# Patient Record
Sex: FEMALE | Race: WHITE
[De-identification: names, ages, dates, MRNs, and addresses within clinical notes are randomized per-mention and may not be internally consistent; named-entity substitution may affect disease eponyms.]

---

## 2018-02-16 ENCOUNTER — HOSPITAL ENCOUNTER (OUTPATIENT)
Dept: HOSPITAL 53 - M SMT | Age: 38
End: 2018-02-16
Attending: OBSTETRICS & GYNECOLOGY
Payer: COMMERCIAL

## 2018-02-16 DIAGNOSIS — R10.2: Primary | ICD-10-CM

## 2018-02-16 LAB
BASO #: 0.1 10^3/UL (ref 0–0.2)
BASO %: 0.8 % (ref 0–1)
EOS #: 0.2 10^3/UL (ref 0–0.5)
EOSINOPHIL NFR BLD AUTO: 1.7 % (ref 0–3)
FOLLICLE STIMULATING HORMONE: 6.4 MIU/ML
HEMATOCRIT: 38.8 % (ref 36–47)
HEMOGLOBIN: 12.1 G/DL (ref 12–16)
IMMATURE GRANULOCYTE %: 0.9 % (ref 0–3)
LH SERPL-ACNC: 1.9 MIU/ML
LYMPH #: 3.3 10^3/UL (ref 1.5–4.5)
LYMPH %: 31.8 % (ref 24–44)
MEAN CORPUSCULAR HEMOGLOBIN: 25.4 PG (ref 27–33)
MEAN CORPUSCULAR HGB CONC: 31.2 G/DL (ref 32–36.5)
MEAN CORPUSCULAR VOLUME: 81.5 FL (ref 80–96)
MONO #: 0.7 10^3/UL (ref 0–0.8)
MONO %: 6.6 % (ref 0–5)
NEUTROPHILS #: 6.1 10^3/UL (ref 1.8–7.7)
NEUTROPHILS %: 58.2 % (ref 36–66)
NRBC BLD AUTO-RTO: 0 % (ref 0–0)
PLATELET COUNT, AUTOMATED: 272 10^3/UL (ref 150–450)
RED BLOOD COUNT: 4.76 10^6/UL (ref 4–5.4)
RED CELL DISTRIBUTION WIDTH: 15.9 % (ref 11.5–14.5)
THYROID STIMULATING HORMONE: 0.6 UIU/ML (ref 0.36–3.74)
WHITE BLOOD COUNT: 10.5 10^3/UL (ref 4–10)

## 2018-02-16 PROCEDURE — 83001 ASSAY OF GONADOTROPIN (FSH): CPT

## 2018-06-14 ENCOUNTER — HOSPITAL ENCOUNTER (OUTPATIENT)
Dept: HOSPITAL 53 - M SMT | Age: 38
End: 2018-06-14
Attending: OBSTETRICS & GYNECOLOGY
Payer: COMMERCIAL

## 2018-06-14 DIAGNOSIS — R10.2: Primary | ICD-10-CM

## 2018-06-14 PROCEDURE — 76830 TRANSVAGINAL US NON-OB: CPT

## 2018-06-20 ENCOUNTER — HOSPITAL ENCOUNTER (OUTPATIENT)
Dept: HOSPITAL 53 - M LAB REF | Age: 38
End: 2018-06-20
Attending: OBSTETRICS & GYNECOLOGY
Payer: COMMERCIAL

## 2018-06-20 DIAGNOSIS — R10.2: Primary | ICD-10-CM

## 2021-04-08 ENCOUNTER — HOSPITAL ENCOUNTER (OUTPATIENT)
Dept: HOSPITAL 53 - M LABSMTC | Age: 41
End: 2021-04-08
Attending: ANESTHESIOLOGY
Payer: MEDICARE

## 2021-04-08 DIAGNOSIS — Z20.828: Primary | ICD-10-CM

## 2021-04-08 DIAGNOSIS — Z11.59: ICD-10-CM

## 2021-04-13 ENCOUNTER — HOSPITAL ENCOUNTER (OUTPATIENT)
Dept: HOSPITAL 53 - M SDC | Age: 41
Setting detail: OBSERVATION
LOS: 1 days | Discharge: HOME | End: 2021-04-14
Attending: PLASTIC SURGERY | Admitting: PLASTIC SURGERY
Payer: MEDICARE

## 2021-04-13 VITALS — SYSTOLIC BLOOD PRESSURE: 121 MMHG | DIASTOLIC BLOOD PRESSURE: 80 MMHG

## 2021-04-13 VITALS — SYSTOLIC BLOOD PRESSURE: 122 MMHG | DIASTOLIC BLOOD PRESSURE: 82 MMHG

## 2021-04-13 VITALS — DIASTOLIC BLOOD PRESSURE: 84 MMHG | SYSTOLIC BLOOD PRESSURE: 124 MMHG

## 2021-04-13 VITALS — DIASTOLIC BLOOD PRESSURE: 81 MMHG | SYSTOLIC BLOOD PRESSURE: 123 MMHG

## 2021-04-13 VITALS — WEIGHT: 210 LBS | BODY MASS INDEX: 35.85 KG/M2 | HEIGHT: 64 IN

## 2021-04-13 DIAGNOSIS — F32.9: ICD-10-CM

## 2021-04-13 DIAGNOSIS — Z88.1: ICD-10-CM

## 2021-04-13 DIAGNOSIS — Z79.899: ICD-10-CM

## 2021-04-13 DIAGNOSIS — E03.9: ICD-10-CM

## 2021-04-13 DIAGNOSIS — K21.9: ICD-10-CM

## 2021-04-13 DIAGNOSIS — Z79.82: ICD-10-CM

## 2021-04-13 DIAGNOSIS — F64.8: ICD-10-CM

## 2021-04-13 DIAGNOSIS — N62: Primary | ICD-10-CM

## 2021-04-13 DIAGNOSIS — Z88.8: ICD-10-CM

## 2021-04-13 DIAGNOSIS — Z88.0: ICD-10-CM

## 2021-04-13 DIAGNOSIS — E11.9: ICD-10-CM

## 2021-04-13 PROCEDURE — 15877 SUCTION LIPECTOMY TRUNK: CPT

## 2021-04-13 PROCEDURE — 19303 MAST SIMPLE COMPLETE: CPT

## 2021-04-13 PROCEDURE — 88300 SURGICAL PATH GROSS: CPT

## 2021-04-13 PROCEDURE — 96376 TX/PRO/DX INJ SAME DRUG ADON: CPT

## 2021-04-13 PROCEDURE — 96374 THER/PROPH/DIAG INJ IV PUSH: CPT

## 2021-04-13 PROCEDURE — 88305 TISSUE EXAM BY PATHOLOGIST: CPT

## 2021-04-13 PROCEDURE — 81025 URINE PREGNANCY TEST: CPT

## 2021-04-13 RX ADMIN — SODIUM CHLORIDE, POTASSIUM CHLORIDE, SODIUM LACTATE AND CALCIUM CHLORIDE SCH MLS/HR: 600; 310; 30; 20 INJECTION, SOLUTION INTRAVENOUS at 20:41

## 2021-04-13 RX ADMIN — FENTANYL CITRATE PRN MCG: 50 INJECTION, SOLUTION INTRAMUSCULAR; INTRAVENOUS at 18:28

## 2021-04-13 RX ADMIN — FENTANYL CITRATE PRN MCG: 50 INJECTION, SOLUTION INTRAMUSCULAR; INTRAVENOUS at 18:39

## 2021-04-13 RX ADMIN — DOCUSATE SODIUM SCH MG: 100 CAPSULE, LIQUID FILLED ORAL at 20:42

## 2021-04-13 RX ADMIN — BETHANECHOL CHLORIDE SCH MG: 10 TABLET ORAL at 20:42

## 2021-04-13 RX ADMIN — GABAPENTIN SCH MG: 400 CAPSULE ORAL at 16:00

## 2021-04-13 RX ADMIN — FENTANYL CITRATE PRN MCG: 50 INJECTION, SOLUTION INTRAMUSCULAR; INTRAVENOUS at 18:33

## 2021-04-13 RX ADMIN — FENTANYL CITRATE PRN MCG: 50 INJECTION, SOLUTION INTRAMUSCULAR; INTRAVENOUS at 18:23

## 2021-04-13 RX ADMIN — POTASSIUM CHLORIDE SCH MEQ: 750 TABLET, EXTENDED RELEASE ORAL at 20:42

## 2021-04-13 RX ADMIN — TOPIRAMATE SCH MG: 100 TABLET, FILM COATED ORAL at 20:41

## 2021-04-13 RX ADMIN — GABAPENTIN SCH MG: 400 CAPSULE ORAL at 20:41

## 2021-04-13 RX ADMIN — MORPHINE SULFATE PRN MG: 4 INJECTION INTRAVENOUS at 22:32

## 2021-04-13 NOTE — ROOPDOC
[facility] Report Of Operation


Report of Operation


DATE OF PROCEDURE: 4/13/21





PREOPERATIVE DIAGNOSIS: Bilateral breast hypertrophy, gender dysphoria.





POSTOPERATIVE DIAGNOSIS: same   





FINDINGS: Female chest pattern 





PROCEDURE: Chest masculinization procedure with help of subcutaneous 

mastectomies, lateral chest liposuction and free nipple graft.





SURGEON: Dr Hernandez





ASSISTANT: Dr Ruiz





ANESTHESIA: general





SPECIMENS: Right breast 440 gm, Left breast 547 gm, liposuction fluid 300 ml. 





ESTIMATED BLOOD LOSS: 75 cc





REPLACED: none





DRAINS: 10 mm MELVIN x 2





COMPLICATIONS: none





POSTOPERATIVE CONDITION: stable











DESCRIPTION OF PROCEDURE: This is a 41-year-old transgender male who willing to 

have chest masculinization procedure. Patient fits the criteria for the 

procedure. He has stopped testosterone 3 weeks ago. Risks, benefits, and 

alternatives were discussed with the patient in detail, and he is ready to 

proceed. 


The day of surgery, he was marked in the upright position and informed consent 

was obtained. He measured 29 cm from sternal notch to nipple on  both sides, IMF

at 22 cm bilaterally. Patient was marked according to double incision 

subcutaneous mastectomy. He was brought into the operating room and placed in 

the supine position. Preoperative antibiotics and 5000 units heparin 

subcutaneous were given. Sequential pneumatic stocking were placed on the lower 

calves. General anesthesia was induced. He was prepped and draped in the usual 

sterile fashion. 





We started our procedure on the right side.  Nipple areolar complex was outlined

20 mm in diameter.  Superior incision carried out followed by the inframammary 

incision. Started undermining the flap with thick edges from the superior 

incision toward the midportion of the pectoralis major then the breast was 

removed starting from top to the inferior incision at the inframammary line 

along the pectoralis fascia. Total weight on the right side is 440 grams. 

Hemostasis was obtained using electrocautery.  We used Exparel 6 cc for local 

anesthesia to infiltrate in the Pectoralis muscle. Started closing the flap with

interrupted 0 Vicryl sutures. 10 mm Jovany-Jose drain was placed through the 

separate stab incision. Then we turn our attention to the left side. Nipple 

areolar complex was outlined 20 mm in diameter.  Superior incision carried out 

followed by the inframammary incision. Started undermining the flap with thick 

edges from the superior incision toward the midportion of the pectoralis major 

then the breast was removed starting from top to the inferior incision at the 

inframammary line along the pectoralis fascia.  Hemostasis was obtained using 

electrocautery. Total weight left side 547 g. We used Exparel 6 cc for local 

anesthesia to infiltrate in the Pectoralis muscle. Started closing the flap with

interrupted 0 Vicryl sutures. 10 mm Jovany-Jose drain was placed through the 

separate stab incision.





Tumescent solution was infiltrated on the lateral chest bilaterally totaling 150

on each side, then the VASER liposuction was performed 2 minutes on each side 

followed by regular light suction to remove the rest of the fatty tissue. Total 

liposuction 300 mL. 50 mL of tumescent solution infiltrated middle of the chest 

and suction assisted lipectomy performed to smooth out the contour.


Than the continued our closure of inframammary incisions with interrupted 3-0 

Monocryl sutures followed by 3-0 Monocryl V lock suture. 





Breast tissue removed was stored on the sterile table. At this point we 

harvested the nipple areolar complex from each breast. Tissue was defatted and 

transferred to a chest as a free graft. Location of the nipple is 2.5 cm from 

inframammary line. Oval incision 1.5x2.4cm was created for the Nipple areolar 

complex graft was secured in place with interrupted 4-0 and 5-0 chromic sutures.

Bolster dressing was applied and secured in place with 4-0 nylon sutures.


Remaining Exparel injected in the horizontal incision. Total Exparel use 20 cc. 


Resected tissue sent to pathology in two specimens right and left breast tissue.




Dressings were applied to vertical and horizontal incision: Xeroform, bulky 

dressing and binder.


Patient was extubated in the operating room without difficulty and was 

transferred to the recovery room in stable condition.











ED EHRNANDEZ DO              Apr 13, 2021 17:37

## 2021-04-13 NOTE — POST-OPPD
Postoperative Procedure Note


Date Of Procedure:  Apr 13, 2021


PREOPERATIVE DIAGNOSIS: Bilateral breast hypertrophy, gender dysphoria.





POSTOPERATIVE DIAGNOSIS: same   





FINDINGS: Female chest pattern 





PROCEDURE: Chest masculinization procedure with help of subcutaneous 

mastectomies, lateral chest liposuction and free nipple graft.





SURGEON: Dr Hernandez





ASSISTANT: Dr Ruiz





ANESTHESIA: general





SPECIMENS: Right breast 440 gm, Left breast 547 gm, liposuction fluid 300 ml. 





ESTIMATED BLOOD LOSS: 75 cc





REPLACED: none





DRAINS: 10 mm MELVIN x 2





COMPLICATIONS: none





POSTOPERATIVE CONDITION: stable











ED HERNANDEZ DO              Apr 13, 2021 17:37

## 2021-04-14 VITALS — SYSTOLIC BLOOD PRESSURE: 120 MMHG | DIASTOLIC BLOOD PRESSURE: 79 MMHG

## 2021-04-14 VITALS — SYSTOLIC BLOOD PRESSURE: 111 MMHG | DIASTOLIC BLOOD PRESSURE: 68 MMHG

## 2021-04-14 VITALS — SYSTOLIC BLOOD PRESSURE: 113 MMHG | DIASTOLIC BLOOD PRESSURE: 74 MMHG

## 2021-04-14 RX ADMIN — GABAPENTIN SCH MG: 400 CAPSULE ORAL at 11:54

## 2021-04-14 RX ADMIN — TOPIRAMATE SCH MG: 100 TABLET, FILM COATED ORAL at 11:53

## 2021-04-14 RX ADMIN — BETHANECHOL CHLORIDE SCH MG: 10 TABLET ORAL at 11:54

## 2021-04-14 RX ADMIN — MORPHINE SULFATE PRN MG: 4 INJECTION INTRAVENOUS at 03:24

## 2021-04-14 RX ADMIN — DOCUSATE SODIUM SCH MG: 100 CAPSULE, LIQUID FILLED ORAL at 11:53

## 2021-04-14 RX ADMIN — POTASSIUM CHLORIDE SCH MEQ: 750 TABLET, EXTENDED RELEASE ORAL at 11:53

## 2021-04-14 RX ADMIN — SODIUM CHLORIDE, POTASSIUM CHLORIDE, SODIUM LACTATE AND CALCIUM CHLORIDE SCH MLS/HR: 600; 310; 30; 20 INJECTION, SOLUTION INTRAVENOUS at 07:00

## 2021-04-14 NOTE — IPNPDOC
Subjective


General


Date Seen:  Apr 14, 2021





Subject


Chief Complaint/History


The patient is a 41-year-old female admitted with a reason for visit of Gender 

Dysphoria, Breast Hypertrophy. 


Patient s/p chest masculinization procedure POD 1. Doing well. Pain controlled. 

Tolerating diet.





Current Medications


Current Medications





Current Medications








 Medications


  (Trade)  Dose


 Ordered  Sig/Damaso


 Route


 PRN Reason  Start Time


 Stop Time Status Last Admin


Dose Admin


 


 Acetaminophen


  (Tylenol Tab)  650 mg  Q6H  PRN


 PO


 MILD PAIN (PS 1-4)  4/13/21 17:40


     





 


 Bethanechol


 Chloride


  (Urecholine)  10 mg  BID


 PO


   4/13/21 21:00


    4/13/21 20:42





 


 Docusate Sodium


  (Colace)  100 mg  BID


 PO


   4/13/21 21:00


    4/13/21 20:42





 


 Fentanyl Citrate


  (Sublimaze)  25 mcg  Q5MP  PRN


 IV


 PAIN LEVEL 5-10  4/13/21 18:00


 4/13/21 19:00 DC 4/13/21 18:39





 


 Furosemide


  (Lasix)  40 mg  DAILY


 PO


   4/14/21 09:00


     





 


 Gabapentin


  (Neurontin)  800 mg  TID


 PO


   4/13/21 16:00


    4/13/21 20:41





 


 Lactated Ringer's  1,000 ml @ 


 75 mls/hr  [id number]


 IV


   4/13/21 17:40


    4/13/21 20:41





 


 Lactated Ringer's  1,000 ml @ 


 100 mls/hr  Q10H


 IV


   4/13/21 18:00


 4/13/21 19:00 DC 4/13/21 17:35





 


 Levothyroxine


 Sodium


  (Synthroid)  75 mcg  DAILY@0600


 PO


   4/14/21 06:00


    4/14/21 05:06





 


 Loratadine


  (Claritin)  10 mg  DAILY


 PO


   4/14/21 09:00


     





 


 Meperidine HCl


  (Demerol)  12.5 mg  Q5MP  PRN


 IV


 SHIVERING  4/13/21 18:00


 4/13/21 19:00 DC  





 


 Metoclopramide HCl


  (REGLAN


 INJection)  10 mg  Q6HP  PRN


 IV


 NAUSEA OR VOMITING  4/13/21 18:00


 4/13/21 19:00 DC  





 


 Miscellaneous


  (Unresolved


 Clarification


 Entry)  SEE LABEL


 COMMENTS  UNRESOLVED


 XX


   4/13/21 00:01


 4/13/21 11:47 DC  





 


 Morphine Sulfate


  (Morphine


 Sulfate Inj)  4 mg  Q4HP  PRN


 IV


 SEVERE PAIN (PS 8-10)  4/13/21 17:40


    4/14/21 03:24





 


 Ondansetron HCl


  (ZOFRAN


 INJection)  4 mg  Q4H  PRN


 IV


 NAUSEA OR VOMITING  4/13/21 17:40


     





 


 Ondansetron HCl


  (ZOFRAN


 INJection)  4 mg  Q4HP  PRN


 IV


 NAUSEA OR VOMITING  4/13/21 18:00


 4/13/21 19:00 DC  





 


 Oxycodone HCl


  (Roxicodone,


 Oxyir)  5 mg  ASDIRECTED  PRN


 PO


 PAIN LEVEL 1-4  4/13/21 18:00


 4/13/21 19:00 DC 4/13/21 18:58





 


 Oxycodone/


 Acetaminophen


  (Percocet 5mg/


 325mg Tablet)  2 tab  Q4HP  PRN


 PO


 PAIN LEVEL 4-7  4/13/21 17:40


    4/14/21 06:09





 


 Pantoprazole


 Sodium


  (Protonix)  20 mg  DAILY


 PO


   4/14/21 09:00


     





 


 Potassium Chloride


  (Micro-K


 Extencaps)  10 meq  BID


 PO


   4/13/21 21:00


    4/13/21 20:42





 


 Quetiapine


 Fumarate


  (SEROquel)  150 mg  QHS


 PO


   4/13/21 21:00


    4/13/21 20:42





 


 Simvastatin


  (Zocor)  20 mg  QPM


 PO


   4/13/21 21:00


    4/13/21 20:41





 


 Topiramate


  (TopAMAX)  100 mg  BID


 PO


   4/13/21 21:00


    4/13/21 20:41





 


 Trazodone HCl


  (Desyrel)  50 mg  QHSP  PRN


 PO


 SLEEP  4/13/21 21:00


    4/14/21 01:09














Allergies


Coded Allergies:  


     TAPE (Verified  Allergy, Unknown, 4/6/21)


     Penicillins (Verified  Adverse Reaction, Mild, vomiting, 4/13/21)


     clarithromycin (Verified  Adverse Reaction, Mild, vomiting, 4/13/21)


     clindamycin (Verified  Adverse Reaction, Mild, vomiting, 4/13/21)


     ketorolac (Verified  Adverse Reaction, Mild, vomiting, 4/13/21)


     naproxen (Verified  Adverse Reaction, Mild, vomiting, 4/13/21)





Objective


Physical Examination


Examination


GENERAL APPEARANCE:Patient seen, laying in bed, awake, alert, and oriented. 

Comfortable, in no acute distress.


SKIN: Warm and moist.


BREAST: Right and left soft, non-tender incisions intact. MELVIN drains: 78/38 cc/24

hr. Post op ecchymosis, minimal tenderness. NAC with bolster dressings intact. 


LUNGS: Clear to auscultation bilaterally. No wheezing appreciated.


HEART: No chest wall abnormalities. Regular rate and rhythm with no murmurs 

appreciated.


ABDOMEN: Abdomen is soft, non-tender, non-distended. 


EXTREMITIES: No edema identified. No calf tenderness.


Vital Signs





Vital Signs








  Date Time  Temp Pulse Resp B/P (MAP) Pulse Ox O2 Delivery O2 Flow Rate FiO2


 


4/14/21 06:47   18     


 


4/14/21 06:00 97.2 67  113/74 (87) 94 Room Air  


 


4/13/21 19:00       10.0 








I&Os











I&O- Last 24 Hours up to 6 AM 


 


 4/14/21





 05:59


 


Intake Total 4700 ml


 


Output Total 1813 ml


 


Balance 2887 ml











Laboratory Data


Labs 24H


Laboratory Tests 2


4/13/21 11:35: Bedside Glucose (Misc Panel) 127H


4/13/21 22:20: Bedside Glucose (Misc Panel) 222H


4/14/21 06:24: Bedside Glucose (Misc Panel) 131H





Impression


Gender dysphoria.


S/p chest masculinization procedure. 


Dressings changed.


Stable for discharge.


Continue with support garment.


Monitor drains and document daily output.


No heavy lifting.


Pain control. 


F/up plastic surgery.





Plan / VTE


VTE Prophylaxis Ordered?:  Yes











ED HERNANDEZ DO              Apr 14, 2021 09:59